# Patient Record
Sex: FEMALE | Race: WHITE | Employment: PART TIME | URBAN - METROPOLITAN AREA
[De-identification: names, ages, dates, MRNs, and addresses within clinical notes are randomized per-mention and may not be internally consistent; named-entity substitution may affect disease eponyms.]

---

## 2017-06-30 PROBLEM — Z00.00 ROUTINE GENERAL MEDICAL EXAMINATION AT A HEALTH CARE FACILITY: Status: ACTIVE | Noted: 2017-06-30

## 2017-08-15 ENCOUNTER — HOSPITAL ENCOUNTER (OUTPATIENT)
Dept: GENERAL RADIOLOGY | Age: 62
Discharge: HOME | End: 2017-08-15
Attending: INTERNAL MEDICINE
Payer: COMMERCIAL

## 2017-08-15 VITALS — WEIGHT: 153 LBS | HEIGHT: 71 IN | BODY MASS INDEX: 21.42 KG/M2

## 2017-08-15 DIAGNOSIS — Z12.11 ENCOUNTER FOR SCREENING FOR MALIGNANT NEOPLASM OF COLON: ICD-10-CM

## 2017-08-15 DIAGNOSIS — Z86.010 HISTORY OF COLON POLYPS: ICD-10-CM

## 2017-08-15 PROCEDURE — 74270 X-RAY XM COLON 1CNTRST STD: CPT

## 2017-08-15 PROCEDURE — 74011000255 HC RX REV CODE- 255: Performed by: INTERNAL MEDICINE

## 2017-08-15 RX ADMIN — BARIUM SULFATE 1900 ML: 1.05 SUSPENSION ORAL; RECTAL at 11:51

## 2017-08-20 ENCOUNTER — HOSPITAL ENCOUNTER (EMERGENCY)
Age: 62
Discharge: HOME | End: 2017-08-20
Attending: EMERGENCY MEDICINE
Payer: COMMERCIAL

## 2017-08-20 ENCOUNTER — APPOINTMENT (OUTPATIENT)
Dept: GENERAL RADIOLOGY | Age: 62
End: 2017-08-20
Attending: NURSE PRACTITIONER
Payer: COMMERCIAL

## 2017-08-20 VITALS
DIASTOLIC BLOOD PRESSURE: 86 MMHG | HEIGHT: 71 IN | OXYGEN SATURATION: 100 % | HEART RATE: 65 BPM | SYSTOLIC BLOOD PRESSURE: 184 MMHG | TEMPERATURE: 98.6 F | BODY MASS INDEX: 21.7 KG/M2 | WEIGHT: 155 LBS | RESPIRATION RATE: 16 BRPM
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
## 2017-08-20 DIAGNOSIS — S90.31XA CONTUSION OF HEEL, RIGHT, INITIAL ENCOUNTER: ICD-10-CM

## 2017-08-20 PROCEDURE — 99283 EMERGENCY DEPT VISIT LOW MDM: CPT | Performed by: NURSE PRACTITIONER

## 2017-08-20 PROCEDURE — 73630 X-RAY EXAM OF FOOT: CPT

## 2017-08-20 PROCEDURE — 73610 X-RAY EXAM OF ANKLE: CPT

## 2017-08-20 RX ORDER — MELOXICAM 7.5 MG/1
7.5 TABLET ORAL DAILY
Qty: 10 TAB | Refills: 0 | Status: SHIPPED | OUTPATIENT
Start: 2017-08-20

## 2017-08-20 NOTE — ED PROVIDER NOTES
HPI Comments: Patient presents with right heel pain after she tripped while walking down her garage stairs. She states pain to her right heel. She has noted elevated blood pressure reading in triage. She states she took her blood pressure medication just prior to arrival. She denies chest pain, syncope, headache, dizziness, and shortness of breath. Patient is a 64 y.o. female presenting with fall. The history is provided by the patient. Fall   The accident occurred 1 to 2 hours ago. The fall occurred while walking. She fell from a height of 1 - 2 ft. She landed on concrete. There was no blood loss. Point of impact: right foot. Pain location: right heel. The pain is at a severity of 6/10. The pain is mild. She was ambulatory at the scene. There was no entrapment after the fall. There was no drug use involved in the accident. There was no alcohol use involved in the accident. Pertinent negatives include no visual change, no fever, no abdominal pain, no bowel incontinence, no nausea, no vomiting, no hematuria, no headaches, no extremity weakness, no hearing loss, no loss of consciousness and no laceration. The symptoms are aggravated by ambulation. She has tried nothing for the symptoms. Past Medical History:   Diagnosis Date    Benign essential HTN 5/22/2015    Lateral epicondylitis 12/7/2015    Personal history of diseases of blood and blood-forming organs 5/22/2015    [facility] Dr Corrine Sarabia.  Dr Luisa Corona       Past Surgical History:   Procedure Laterality Date    HX OTHER SURGICAL  chemo 98 and 01, and radiation    lymph nodes    HX VASCULAR ACCESS           Family History:   Problem Relation Age of Onset    Hypertension Mother     Hypertension Father     Cancer Father     Hypertension Brother        Social History     Social History    Marital status:      Spouse name: N/A    Number of children: 4    Years of education: N/A     Occupational History    works for [employer] Social History Main Topics    Smoking status: Never Smoker    Smokeless tobacco: Never Used    Alcohol use Yes      Comment: beer / wine occassional    Drug use: No    Sexual activity: Yes     Partners: Male     Birth control/ protection: None     Other Topics Concern    Exercise Yes     skis in winter, and walks and active in [place] when here     Social History Narrative    , lives in 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 Review of Systems   Constitutional: Negative for fever. Gastrointestinal: Negative for abdominal pain, bowel incontinence, nausea and vomiting. Genitourinary: Negative for hematuria. Musculoskeletal: Negative for extremity weakness. Neurological: Negative for loss of consciousness and headaches. Visit Vitals    /86 (BP 1 Location: Right arm, BP Patient Position: At rest)    Pulse 65    Temp 98.6 °F (37 °C)    Resp 16    Ht 5' 11\" (1.803 m)    Wt 70.3 kg (155 lb)    SpO2 100%    BMI 21.62 kg/m2   '         Physical Exam   Constitutional: She is oriented to person, place, and time. She appears well-developed and well-nourished. No distress. Cardiovascular: Normal rate and regular rhythm. No murmur heard. Pulmonary/Chest: Effort normal and breath sounds normal. No respiratory distress. She has no wheezes. Musculoskeletal:        Right ankle: She exhibits normal range of motion, no swelling, no ecchymosis and normal pulse. Achilles tendon exhibits no pain. Right foot: There is bony tenderness. There is no swelling, normal capillary refill, no crepitus and no deformity. Feet:    Neurological: She is alert and oriented to person, place, and time. Skin: Skin is warm and dry. No laceration noted. She is not diaphoretic. Psychiatric: She has a normal mood and affect.  Her behavior is normal.   Nursing note and vitals reviewed. Xr Ankle Rt Min 3 V    Result Date: 8/20/2017   XR ANKLE RT MIN 3 V, XR FOOT RT MIN 3 V    8/20/2017 9:29 AM CLINICAL INFORMATION:  64year-old status post fall downstairs this morning. Moderate to severe medial pain. Comparison: None available Ankle: 3 views right ankle. Alignment is anatomic. No acute fracture. The mortise is preserved. No ankle joint effusion. Right foot: 3 views. Anatomic alignment. No acute fracture. Joint spaces are preserved. No radiopaque foreign body nor soft tissue gas. IMPRESSION: 1. No acute abnormality in the right foot or ankle. Xr Foot Rt Min 3 V    Result Date: 8/20/2017   XR ANKLE RT MIN 3 V, XR FOOT RT MIN 3 V    8/20/2017 9:29 AM CLINICAL INFORMATION:  64year-old status post fall downstairs this morning. Moderate to severe medial pain. Comparison: None available Ankle: 3 views right ankle. Alignment is anatomic. No acute fracture. The mortise is preserved. No ankle joint effusion. Right foot: 3 views. Anatomic alignment. No acute fracture. Joint spaces are preserved. No radiopaque foreign body nor soft tissue gas. IMPRESSION: 1. No acute abnormality in the right foot or ankle. Xr Ba Enema    Result Date: 8/15/2017  Barium enema INDICATION: Incomplete colonoscopy, history of colon polyps Fluoroscopy time: 3 minutes, 25 spot films FINDINGS: The  film is unremarkable. A double contrast barium enema was then performed. The colon is extremely tortuous. No filling defects or fold abnormalities are seen. There is no evidence of a stricture or stenosis. The small bowel was not refluxed. IMPRESSION: Tortuous colon, otherwise unremarkable study    MDM  Number of Diagnoses or Management Options  Contusion of heel, right, initial encounter:   Fall, initial encounter:   Diagnosis management comments: No acute fracture noted on foot and ankle xray. Ace wrap applied, patient given crutches, and mobic for at home.  Encouraged her to follow up with naomie cool for symptoms that fail to improve. She refused pain medication while in the department.         Amount and/or Complexity of Data Reviewed  Tests in the radiology section of CPT®: ordered and reviewed    Patient Progress  Patient progress: stable    ED Course       Procedures

## 2019-09-25 PROBLEM — Z00.00 ROUTINE GENERAL MEDICAL EXAMINATION AT A HEALTH CARE FACILITY: Status: RESOLVED | Noted: 2017-06-30 | Resolved: 2019-09-25
